# Patient Record
Sex: FEMALE | Race: WHITE | URBAN - METROPOLITAN AREA
[De-identification: names, ages, dates, MRNs, and addresses within clinical notes are randomized per-mention and may not be internally consistent; named-entity substitution may affect disease eponyms.]

---

## 2022-02-22 ENCOUNTER — EMERGENCY (EMERGENCY)
Facility: HOSPITAL | Age: 27
LOS: 0 days | Discharge: HOME | End: 2022-02-23
Attending: EMERGENCY MEDICINE
Payer: COMMERCIAL

## 2022-02-22 VITALS
TEMPERATURE: 98 F | DIASTOLIC BLOOD PRESSURE: 68 MMHG | HEIGHT: 61.42 IN | SYSTOLIC BLOOD PRESSURE: 105 MMHG | OXYGEN SATURATION: 97 % | HEART RATE: 94 BPM | WEIGHT: 119.93 LBS | RESPIRATION RATE: 18 BRPM

## 2022-02-22 DIAGNOSIS — F31.9 BIPOLAR DISORDER, UNSPECIFIED: ICD-10-CM

## 2022-02-22 DIAGNOSIS — Z20.822 CONTACT WITH AND (SUSPECTED) EXPOSURE TO COVID-19: ICD-10-CM

## 2022-02-22 DIAGNOSIS — R45.1 RESTLESSNESS AND AGITATION: ICD-10-CM

## 2022-02-22 DIAGNOSIS — R45.6 VIOLENT BEHAVIOR: ICD-10-CM

## 2022-02-22 DIAGNOSIS — F41.9 ANXIETY DISORDER, UNSPECIFIED: ICD-10-CM

## 2022-02-22 LAB
ALBUMIN SERPL ELPH-MCNC: 2.7 G/DL — LOW (ref 3.3–5)
ALP SERPL-CCNC: 88 U/L — SIGNIFICANT CHANGE UP (ref 40–120)
ALT FLD-CCNC: 42 U/L — SIGNIFICANT CHANGE UP (ref 12–78)
ANION GAP SERPL CALC-SCNC: 5 MMOL/L — SIGNIFICANT CHANGE UP (ref 5–17)
AST SERPL-CCNC: 50 U/L — HIGH (ref 15–37)
BASOPHILS # BLD AUTO: 0.02 K/UL — SIGNIFICANT CHANGE UP (ref 0–0.2)
BASOPHILS NFR BLD AUTO: 0.4 % — SIGNIFICANT CHANGE UP (ref 0–2)
BILIRUB SERPL-MCNC: 0.3 MG/DL — SIGNIFICANT CHANGE UP (ref 0.2–1.2)
BUN SERPL-MCNC: 13 MG/DL — SIGNIFICANT CHANGE UP (ref 7–23)
CALCIUM SERPL-MCNC: 8.8 MG/DL — SIGNIFICANT CHANGE UP (ref 8.5–10.1)
CHLORIDE SERPL-SCNC: 107 MMOL/L — SIGNIFICANT CHANGE UP (ref 96–108)
CO2 SERPL-SCNC: 28 MMOL/L — SIGNIFICANT CHANGE UP (ref 22–31)
CREAT SERPL-MCNC: 0.87 MG/DL — SIGNIFICANT CHANGE UP (ref 0.5–1.3)
EOSINOPHIL # BLD AUTO: 0.14 K/UL — SIGNIFICANT CHANGE UP (ref 0–0.5)
EOSINOPHIL NFR BLD AUTO: 2.5 % — SIGNIFICANT CHANGE UP (ref 0–6)
ETHANOL SERPL-MCNC: <10 MG/DL — SIGNIFICANT CHANGE UP (ref 0–10)
GLUCOSE SERPL-MCNC: 98 MG/DL — SIGNIFICANT CHANGE UP (ref 70–99)
HCT VFR BLD CALC: 35.8 % — SIGNIFICANT CHANGE UP (ref 34.5–45)
HGB BLD-MCNC: 11.8 G/DL — SIGNIFICANT CHANGE UP (ref 11.5–15.5)
IMM GRANULOCYTES NFR BLD AUTO: 0.4 % — SIGNIFICANT CHANGE UP (ref 0–1.5)
LYMPHOCYTES # BLD AUTO: 1.81 K/UL — SIGNIFICANT CHANGE UP (ref 1–3.3)
LYMPHOCYTES # BLD AUTO: 32.6 % — SIGNIFICANT CHANGE UP (ref 13–44)
MCHC RBC-ENTMCNC: 30.3 PG — SIGNIFICANT CHANGE UP (ref 27–34)
MCHC RBC-ENTMCNC: 33 GM/DL — SIGNIFICANT CHANGE UP (ref 32–36)
MCV RBC AUTO: 92 FL — SIGNIFICANT CHANGE UP (ref 80–100)
MONOCYTES # BLD AUTO: 0.76 K/UL — SIGNIFICANT CHANGE UP (ref 0–0.9)
MONOCYTES NFR BLD AUTO: 13.7 % — SIGNIFICANT CHANGE UP (ref 2–14)
NEUTROPHILS # BLD AUTO: 2.81 K/UL — SIGNIFICANT CHANGE UP (ref 1.8–7.4)
NEUTROPHILS NFR BLD AUTO: 50.4 % — SIGNIFICANT CHANGE UP (ref 43–77)
PLATELET # BLD AUTO: 142 K/UL — LOW (ref 150–400)
POTASSIUM SERPL-MCNC: 4.1 MMOL/L — SIGNIFICANT CHANGE UP (ref 3.5–5.3)
POTASSIUM SERPL-SCNC: 4.1 MMOL/L — SIGNIFICANT CHANGE UP (ref 3.5–5.3)
PROT SERPL-MCNC: 6.7 GM/DL — SIGNIFICANT CHANGE UP (ref 6–8.3)
RBC # BLD: 3.89 M/UL — SIGNIFICANT CHANGE UP (ref 3.8–5.2)
RBC # FLD: 13.1 % — SIGNIFICANT CHANGE UP (ref 10.3–14.5)
SODIUM SERPL-SCNC: 140 MMOL/L — SIGNIFICANT CHANGE UP (ref 135–145)
WBC # BLD: 5.56 K/UL — SIGNIFICANT CHANGE UP (ref 3.8–10.5)
WBC # FLD AUTO: 5.56 K/UL — SIGNIFICANT CHANGE UP (ref 3.8–10.5)

## 2022-02-22 PROCEDURE — U0005: CPT

## 2022-02-22 PROCEDURE — 85025 COMPLETE CBC W/AUTO DIFF WBC: CPT

## 2022-02-22 PROCEDURE — 80053 COMPREHEN METABOLIC PANEL: CPT

## 2022-02-22 PROCEDURE — 80307 DRUG TEST PRSMV CHEM ANLYZR: CPT

## 2022-02-22 PROCEDURE — 36415 COLL VENOUS BLD VENIPUNCTURE: CPT

## 2022-02-22 PROCEDURE — U0003: CPT

## 2022-02-22 PROCEDURE — 99283 EMERGENCY DEPT VISIT LOW MDM: CPT | Mod: 25

## 2022-02-22 PROCEDURE — 99285 EMERGENCY DEPT VISIT HI MDM: CPT

## 2022-02-22 PROCEDURE — 96372 THER/PROPH/DIAG INJ SC/IM: CPT

## 2022-02-22 PROCEDURE — 84702 CHORIONIC GONADOTROPIN TEST: CPT
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
## 2022-02-22 NOTE — ED PROVIDER NOTE - PATIENT PORTAL LINK FT
You can access the FollowMyHealth Patient Portal offered by [facility] by registering at the following website: [email or web address]. By joining [employer]’s FollowMyHealth portal, you will also be able to view your health information using other applications (apps) compatible with our system.

## 2022-02-22 NOTE — ED ADULT TRIAGE NOTE - CHIEF COMPLAINT QUOTE
patient brought in from [address] for explosive behavior.  as per staff, patient medications have been changed and over the day patient has become more aggressive and distractive in the home.  patient currently calm and cooperative at triage.

## 2022-02-23 VITALS
DIASTOLIC BLOOD PRESSURE: 69 MMHG | TEMPERATURE: 98 F | RESPIRATION RATE: 17 BRPM | SYSTOLIC BLOOD PRESSURE: 109 MMHG | OXYGEN SATURATION: 98 % | HEART RATE: 90 BPM

## 2022-02-23 DIAGNOSIS — F84.0 AUTISTIC DISORDER: ICD-10-CM

## 2022-02-23 DIAGNOSIS — F63.81 INTERMITTENT EXPLOSIVE DISORDER: ICD-10-CM

## 2022-02-23 LAB
APAP SERPL-MCNC: <2 UG/ML — LOW (ref 10–30)
HCG SERPL-ACNC: <1 MIU/ML — SIGNIFICANT CHANGE UP
SALICYLATES SERPL-MCNC: <1.7 MG/DL — LOW (ref 2.8–20)
SARS-COV-2 RNA SPEC QL NAA+PROBE: SIGNIFICANT CHANGE UP

## 2022-02-23 PROCEDURE — 90792 PSYCH DIAG EVAL W/MED SRVCS: CPT | Mod: 95

## 2022-02-23 RX ORDER — DIPHENHYDRAMINE HCL 50 MG
50 CAPSULE ORAL ONCE
Refills: 0 | Status: COMPLETED | OUTPATIENT
Start: 2022-02-23 | End: 2022-02-23

## 2022-02-23 RX ADMIN — Medication 2 MILLIGRAM(S): at 02:53

## 2022-02-23 RX ADMIN — Medication 50 MILLIGRAM(S): at 02:53

## 2022-02-23 NOTE — ED BEHAVIORAL HEALTH ASSESSMENT NOTE - HPI (INCLUDE ILLNESS QUALITY, SEVERITY, DURATION, TIMING, CONTEXT, MODIFYING FACTORS, ASSOCIATED SIGNS AND SYMPTOMS)
Mercedes is a 25yo woman (domiciled at [facility]) with PPHx of Intellectual Disability/Autism, Intermittent Explosive DO and charted BIpolar DO (previous hospitalizations, multiple ED visits for aggressive behavior, last seen in ED 2/15/22) BIB residence staff for increased aggressive behavior. Allegedly, she recently bit herself and broke her iPad.    Mercedes was interviewed in private room with 1:1. She was calm, pleasant, briefly engaged for conversation though limited. She repeatedly asked me when my birthday was. She reported feeling well but occasionally feeling sad, which she related to having a dream about [place]. She denied breaking an iPad at her residence, but did endorse biting herself and sustaining multiple "lugo israel." She denied having thoughts of harming herself, endorsed taking medication. She reported she completed school work every day. She began closing her eyes and covering up with blankets during interview, and eventually terminated in order to go to sleep. She did not require IM medicaiton in ED, or restraint. She was appropriate with staff and appeared calm.    See  note for collateral from group home staff. They report she has had a recent change in medication (though did not bring medication list). She is impulsive/violent at baseline. They deny safety concerns bringing her home.    Of note, Mercedes has two charts. She was seen in [facility] ED on 2/15/22 for similar complaint, treated and released. According to that chart, at that time she was prescribed risperidone 1mg/2mg, clonazepam 0.5mg/0.5mg/1mg. Unknown if her medications have changed since that listing. Mercedes is a 25yo woman (domiciled at [facility]) with PPHx of Intellectual Disability/Autism, Intermittent Explosive DO and charted BIpolar DO (previous hospitalizations, multiple ED visits for aggressive behavior, last seen in ED 2/15/22) BIB residence staff for increased aggressive behavior. Allegedly, she recently bit herself and broke her iPad.    Mercedes was interviewed in private room with 1:1. She was calm, pleasant, briefly engaged for conversation though limited. She repeatedly asked me when my birthday was. She reported feeling well but occasionally feeling sad, which she related to having a dream about [place]. She denied breaking an iPad at her residence, but did endorse biting herself and sustaining multiple "lugo israel." She denied having thoughts of harming herself, endorsed taking medication. She reported she completed school work every day (she attends day program). She began closing her eyes and covering up with blankets during interview, and eventually terminated in order to go to sleep. She did not require IM medication in ED, or restraint. She was appropriate with staff and appeared calm.    See  note for collateral from group home staff. They report she has had a recent change in medication (though did not bring medication list). She is impulsive/violent at baseline, though may be worse lately. They deny safety concerns bringing her home.    Of note, Mercedes has two charts. She was seen in [facility]\"" ED on 2/15/22 for similar complaint, treated and released. According to that chart, at that time she was prescribed risperidone 1mg/2mg, clonazepam 0.5mg/0.5mg/1mg. Unknown if her medications have changed since that listing.
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
## 2022-02-23 NOTE — ED BEHAVIORAL HEALTH ASSESSMENT NOTE - RISK ASSESSMENT
Mod: Improperly treated impulsivity  Unmod: Intellectual disability, hx aggression, hx hospitalization, poor insight  Protective: not suicidal, structured home environment    Elevated risk by history and diagnosis, but acute risk low, not violent in ED. Risk agusto be mitigated by continued outpatient care and use of structured OPWDD setting. Low Acute Suicide Risk

## 2022-02-23 NOTE — ED ADULT NURSE NOTE - CHIEF COMPLAINT QUOTE
patient brought in from [address] for explosive behavior.  as per staff, patient medications have been changed and over the day patient has become more aggressive and distractive in the home.  patient currently calm and cooperative at triage.

## 2022-02-23 NOTE — ED ADULT NURSE NOTE - OBJECTIVE STATEMENT
patient brought in from [address] for explosive behavior.  as per staff, patient medications have been changed and over the day patient has become more aggressive and distractive in the home.  patient currently calm and cooperative at triage.

## 2022-02-23 NOTE — ED BEHAVIORAL HEALTH ASSESSMENT NOTE - REFERRAL / APPOINTMENT DETAILS
Nursing Note by Zen Gonzales at 06/22/18 10:34 AM     Author:  Zen Gonzales Service:  (none) Author Type:  Certified Medical Assistant     Filed:  06/22/18 10:35 AM Encounter Date:  6/22/2018 Status:  Signed     :  Zen Gonzales (Certified Medical Assistant)            If provider orders tests at today's visit, patient would like to be contacted via ([employer] or by telephone). If to contact patient by phone, patient's preferred phone # is [phone] (cell) and it is ok to leave message on voice mail or with family member.  If medications are ordered at today's visit, the pharmacy name/location patient would like them to be sent to is    [facility] [address][EL1.1T]      Revision History        User Key Date/Time User Provider Type Action    > EL1.1 06/22/18 10:35 AM Zen Gonzales Certified Medical Assistant Sign    T - Template
F/u with current provider

## 2023-01-24 ENCOUNTER — OFFICE (OUTPATIENT)
Dept: URBAN - METROPOLITAN AREA CLINIC 109 | Facility: CLINIC | Age: 28
Setting detail: OPHTHALMOLOGY
End: 2023-01-24
Payer: COMMERCIAL

## 2023-01-24 DIAGNOSIS — E11.9: ICD-10-CM

## 2023-01-24 PROCEDURE — 92014 COMPRE OPH EXAM EST PT 1/>: CPT | Performed by: OPHTHALMOLOGY

## 2023-01-24 ASSESSMENT — VISUAL ACUITY
OS_BCVA: 20/25-2
OD_BCVA: 20/25

## 2023-01-24 ASSESSMENT — TONOMETRY
OS_IOP_MMHG: 10
OD_IOP_MMHG: 10

## 2023-02-21 ENCOUNTER — EMERGENCY (EMERGENCY)
Facility: HOSPITAL | Age: 28
LOS: 0 days | Discharge: HOME | End: 2023-02-22
Attending: EMERGENCY MEDICINE
Payer: COMMERCIAL

## 2023-02-21 VITALS
RESPIRATION RATE: 16 BRPM | SYSTOLIC BLOOD PRESSURE: 106 MMHG | HEART RATE: 75 BPM | TEMPERATURE: 98 F | DIASTOLIC BLOOD PRESSURE: 74 MMHG | OXYGEN SATURATION: 100 % | WEIGHT: 125 LBS

## 2023-02-21 DIAGNOSIS — R10.9 UNSPECIFIED ABDOMINAL PAIN: ICD-10-CM

## 2023-02-21 DIAGNOSIS — F60.3 BORDERLINE PERSONALITY DISORDER: ICD-10-CM

## 2023-02-21 DIAGNOSIS — K59.00 CONSTIPATION, UNSPECIFIED: ICD-10-CM

## 2023-02-21 DIAGNOSIS — F84.0 AUTISTIC DISORDER: ICD-10-CM

## 2023-02-21 DIAGNOSIS — Z79.84 LONG TERM (CURRENT) USE OF ORAL HYPOGLYCEMIC DRUGS: ICD-10-CM

## 2023-02-21 DIAGNOSIS — R73.03 PREDIABETES: ICD-10-CM

## 2023-02-21 LAB
ALBUMIN SERPL ELPH-MCNC: 2.9 G/DL — LOW (ref 3.3–5)
ALP SERPL-CCNC: 68 U/L — SIGNIFICANT CHANGE UP (ref 40–120)
ALT FLD-CCNC: 20 U/L — SIGNIFICANT CHANGE UP (ref 12–78)
ANION GAP SERPL CALC-SCNC: 3 MMOL/L — LOW (ref 5–17)
APPEARANCE UR: CLEAR — SIGNIFICANT CHANGE UP
AST SERPL-CCNC: 25 U/L — SIGNIFICANT CHANGE UP (ref 15–37)
BACTERIA # UR AUTO: ABNORMAL
BASOPHILS # BLD AUTO: 0.03 K/UL — SIGNIFICANT CHANGE UP (ref 0–0.2)
BASOPHILS NFR BLD AUTO: 0.4 % — SIGNIFICANT CHANGE UP (ref 0–2)
BILIRUB SERPL-MCNC: 0.3 MG/DL — SIGNIFICANT CHANGE UP (ref 0.2–1.2)
BILIRUB UR-MCNC: NEGATIVE — SIGNIFICANT CHANGE UP
BUN SERPL-MCNC: 16 MG/DL — SIGNIFICANT CHANGE UP (ref 7–23)
CALCIUM SERPL-MCNC: 8.5 MG/DL — SIGNIFICANT CHANGE UP (ref 8.5–10.1)
CHLORIDE SERPL-SCNC: 105 MMOL/L — SIGNIFICANT CHANGE UP (ref 96–108)
CO2 SERPL-SCNC: 28 MMOL/L — SIGNIFICANT CHANGE UP (ref 22–31)
COLOR SPEC: YELLOW — SIGNIFICANT CHANGE UP
CREAT SERPL-MCNC: 0.74 MG/DL — SIGNIFICANT CHANGE UP (ref 0.5–1.3)
DIFF PNL FLD: NEGATIVE — SIGNIFICANT CHANGE UP
EGFR: 114 ML/MIN/1.73M2 — SIGNIFICANT CHANGE UP
EOSINOPHIL # BLD AUTO: 0.08 K/UL — SIGNIFICANT CHANGE UP (ref 0–0.5)
EOSINOPHIL NFR BLD AUTO: 1 % — SIGNIFICANT CHANGE UP (ref 0–6)
EPI CELLS # UR: SIGNIFICANT CHANGE UP
GLUCOSE SERPL-MCNC: 97 MG/DL — SIGNIFICANT CHANGE UP (ref 70–99)
GLUCOSE UR QL: NEGATIVE — SIGNIFICANT CHANGE UP
HCT VFR BLD CALC: 36.3 % — SIGNIFICANT CHANGE UP (ref 34.5–45)
HGB BLD-MCNC: 12.4 G/DL — SIGNIFICANT CHANGE UP (ref 11.5–15.5)
IMM GRANULOCYTES NFR BLD AUTO: 0.1 % — SIGNIFICANT CHANGE UP (ref 0–0.9)
KETONES UR-MCNC: ABNORMAL
LEUKOCYTE ESTERASE UR-ACNC: ABNORMAL
LIDOCAIN IGE QN: 231 U/L — SIGNIFICANT CHANGE UP (ref 73–393)
LYMPHOCYTES # BLD AUTO: 3.01 K/UL — SIGNIFICANT CHANGE UP (ref 1–3.3)
LYMPHOCYTES # BLD AUTO: 39.1 % — SIGNIFICANT CHANGE UP (ref 13–44)
MCHC RBC-ENTMCNC: 30.8 PG — SIGNIFICANT CHANGE UP (ref 27–34)
MCHC RBC-ENTMCNC: 34.2 GM/DL — SIGNIFICANT CHANGE UP (ref 32–36)
MCV RBC AUTO: 90.1 FL — SIGNIFICANT CHANGE UP (ref 80–100)
MONOCYTES # BLD AUTO: 0.93 K/UL — HIGH (ref 0–0.9)
MONOCYTES NFR BLD AUTO: 12.1 % — SIGNIFICANT CHANGE UP (ref 2–14)
NEUTROPHILS # BLD AUTO: 3.64 K/UL — SIGNIFICANT CHANGE UP (ref 1.8–7.4)
NEUTROPHILS NFR BLD AUTO: 47.3 % — SIGNIFICANT CHANGE UP (ref 43–77)
NITRITE UR-MCNC: NEGATIVE — SIGNIFICANT CHANGE UP
PH UR: 7 — SIGNIFICANT CHANGE UP (ref 5–8)
PLATELET # BLD AUTO: 174 K/UL — SIGNIFICANT CHANGE UP (ref 150–400)
POTASSIUM SERPL-MCNC: 3.7 MMOL/L — SIGNIFICANT CHANGE UP (ref 3.5–5.3)
POTASSIUM SERPL-SCNC: 3.7 MMOL/L — SIGNIFICANT CHANGE UP (ref 3.5–5.3)
PROT SERPL-MCNC: 6.9 GM/DL — SIGNIFICANT CHANGE UP (ref 6–8.3)
PROT UR-MCNC: NEGATIVE — SIGNIFICANT CHANGE UP
RBC # BLD: 4.03 M/UL — SIGNIFICANT CHANGE UP (ref 3.8–5.2)
RBC # FLD: 13 % — SIGNIFICANT CHANGE UP (ref 10.3–14.5)
RBC CASTS # UR COMP ASSIST: NEGATIVE /HPF — SIGNIFICANT CHANGE UP (ref 0–4)
SODIUM SERPL-SCNC: 136 MMOL/L — SIGNIFICANT CHANGE UP (ref 135–145)
SP GR SPEC: 1 — LOW (ref 1.01–1.02)
UROBILINOGEN FLD QL: 1
WBC # BLD: 7.7 K/UL — SIGNIFICANT CHANGE UP (ref 3.8–10.5)
WBC # FLD AUTO: 7.7 K/UL — SIGNIFICANT CHANGE UP (ref 3.8–10.5)
WBC UR QL: SIGNIFICANT CHANGE UP /HPF (ref 0–5)

## 2023-02-21 PROCEDURE — 85025 COMPLETE CBC W/AUTO DIFF WBC: CPT

## 2023-02-21 PROCEDURE — 96374 THER/PROPH/DIAG INJ IV PUSH: CPT

## 2023-02-21 PROCEDURE — 81001 URINALYSIS AUTO W/SCOPE: CPT

## 2023-02-21 PROCEDURE — 83690 ASSAY OF LIPASE: CPT

## 2023-02-21 PROCEDURE — 87086 URINE CULTURE/COLONY COUNT: CPT

## 2023-02-21 PROCEDURE — 84702 CHORIONIC GONADOTROPIN TEST: CPT

## 2023-02-21 PROCEDURE — 99284 EMERGENCY DEPT VISIT MOD MDM: CPT | Mod: 25

## 2023-02-21 PROCEDURE — 36415 COLL VENOUS BLD VENIPUNCTURE: CPT

## 2023-02-21 PROCEDURE — 80053 COMPREHEN METABOLIC PANEL: CPT

## 2023-02-21 PROCEDURE — 99284 EMERGENCY DEPT VISIT MOD MDM: CPT

## 2023-02-21 PROCEDURE — 74018 RADEX ABDOMEN 1 VIEW: CPT

## 2023-02-21 NOTE — ED PROVIDER NOTE - NSFOLLOWUPINSTRUCTIONS_ED_ALL_ED_FT
D/C with PMD follow up and anticipatory guidance.  Return for worsening or persistent symptoms.  please return for any new or worsening symptoms.

## 2023-02-21 NOTE — ED ADULT TRIAGE NOTE - CHIEF COMPLAINT QUOTE
Pt. BIBEMS from group home c/o abdominal pain. As per EMS pt. has hx. autism and began c/o abdominal pain this afternoon and became agitated. Pt. currently not c/o pain.

## 2023-02-21 NOTE — ED PROVIDER NOTE - WR ORDER DATE AND TIME 1
Addended by: Freddie Santana on: 6/13/2019 04:25 PM     Modules accepted: Orders, SmartSet
21-Feb-2023 22:29

## 2023-02-21 NOTE — ED PROVIDER NOTE - PATIENT PORTAL LINK FT
You can access the FollowMyHealth Patient Portal offered by [facility] by registering at the following website: [email or web address]. By joining [email or web address]’s FollowMyHealth portal, you will also be able to view your health information using other applications (apps) compatible with our system.

## 2023-02-21 NOTE — ED PROVIDER NOTE - CLINICAL SUMMARY MEDICAL DECISION MAKING FREE TEXT BOX
Labs WNl.  XR with stool burden, no SBO.  No abdominal TTP on exam, patient appear well, hydrated.  D/c home with supportive care, f/u with PCP, return precautions given.

## 2023-02-21 NOTE — ED PROVIDER NOTE - OBJECTIVE STATEMENT
28 y/o female with PMHx of prediabetes, autism, and impulsive personality disorder BIBEMS from group home to ED c/o abdominal pain. Per EMS, symptoms started this afternoon and pt became more agitated. Pt is a poor historian, unable to give reliable history and aide is not present. Pt points to middle of abdomen when asked where pain is.

## 2023-02-21 NOTE — ED PROVIDER NOTE - PROGRESS NOTE DETAILS
Migdalia SAM: s/o received from Dr. Hidalgo, pending abdominal XR. appears wnl with large stool burden. nontender abdomen while in ED.  will dc home.

## 2023-02-21 NOTE — ED ADULT NURSE NOTE - OBJECTIVE STATEMENT
Pt brought in by EMS from group home for abdominal pain. Pt has hx autism and is Aox1 at baseline. Pt is poor medical historian but able to give details on location of pain. Pt endorses abdominal pain and pointed to epigastric region. Pt unable to give any other s/s. Pt is ambulatory in the ED with steady gait and ambulated to bathroom. Pt aide from group home at bedside at 23:15. Pt safety and comfort maintained.

## 2023-02-22 LAB — HCG SERPL-ACNC: <1 MIU/ML — SIGNIFICANT CHANGE UP

## 2023-02-22 PROCEDURE — 74018 RADEX ABDOMEN 1 VIEW: CPT | Mod: 26

## 2023-02-22 RX ORDER — LANOLIN/MINERAL OIL
1 LOTION (ML) TOPICAL
Qty: 0 | Refills: 0 | DISCHARGE

## 2023-02-22 RX ORDER — METFORMIN HYDROCHLORIDE 850 MG/1
1 TABLET ORAL
Qty: 0 | Refills: 0 | DISCHARGE

## 2023-02-22 RX ORDER — FLUTICASONE PROPIONATE 0.5 MG/G
1 CREAM TOPICAL
Qty: 0 | Refills: 0 | DISCHARGE

## 2023-02-22 RX ORDER — OLANZAPINE 15 MG/1
1 TABLET, FILM COATED ORAL
Qty: 0 | Refills: 0 | DISCHARGE

## 2023-02-22 RX ORDER — SERTRALINE 25 MG/1
1 TABLET, FILM COATED ORAL
Qty: 0 | Refills: 0 | DISCHARGE

## 2023-02-22 RX ORDER — ACETAMINOPHEN AND PAMABROM AND PYRILAMINE MALEATE 500; 25; 15 MG/1; MG/1; MG/1
2 TABLET ORAL
Qty: 0 | Refills: 0 | DISCHARGE

## 2023-02-22 RX ORDER — DIVALPROEX SODIUM 500 MG/1
1 TABLET, DELAYED RELEASE ORAL
Qty: 0 | Refills: 0 | DISCHARGE

## 2023-02-22 RX ORDER — DIVALPROEX SODIUM 500 MG/1
3 TABLET, DELAYED RELEASE ORAL
Qty: 0 | Refills: 0 | DISCHARGE

## 2023-02-22 RX ORDER — LANOLIN ALCOHOL/MO/W.PET/CERES
1 CREAM (GRAM) TOPICAL
Qty: 0 | Refills: 0 | DISCHARGE

## 2023-02-22 RX ORDER — SODIUM FLUORIDE 1.1 G/100G
1 GEL ORAL
Qty: 0 | Refills: 0 | DISCHARGE

## 2023-02-22 RX ORDER — CLONAZEPAM 1 MG
1 TABLET ORAL
Qty: 0 | Refills: 0 | DISCHARGE

## 2023-02-22 RX ADMIN — Medication 0.5 MILLIGRAM(S): at 02:04

## 2023-02-22 NOTE — ED POST DISCHARGE NOTE - REASON FOR FOLLOW-UP
Pt called looking for XR abd results. Informed her that the XRs showed stool burden and to use stool softners along with chaging her diet to included fruits, vegetales and fber. Pt aware and agrees with plan. -Kunal Syed PA-C Other

## 2023-02-23 LAB
CULTURE RESULTS: SIGNIFICANT CHANGE UP
SPECIMEN SOURCE: SIGNIFICANT CHANGE UP

## 2023-04-04 ASSESSMENT — SPHEQUIV_DERIVED
OS_SPHEQUIV: -2.125
OD_SPHEQUIV: -2.5

## 2023-04-04 ASSESSMENT — REFRACTION_CURRENTRX
OD_AXIS: 0
OD_SPHERE: -1.75
OD_CYLINDER: 0.00
OS_AXIS: 0
OS_OVR_VA: 20/
OS_CYLINDER: 0.00
OS_SPHERE: -1.50
OD_OVR_VA: 20/

## 2023-04-04 ASSESSMENT — REFRACTION_AUTOREFRACTION
OD_AXIS: 023
OD_SPHERE: -2.25
OS_AXIS: 180
OD_CYLINDER: -0.50
OS_SPHERE: -2.00
OS_CYLINDER: -0.25

## 2023-04-04 ASSESSMENT — REFRACTION_MANIFEST
OD_SPHERE: -1.75
OS_SPHERE: -1.50

## 2023-04-27 NOTE — ED BEHAVIORAL HEALTH ASSESSMENT NOTE - VIOLENCE RISK FACTORS:
Azithromycin Pregnancy And Lactation Text: This medication is considered safe during pregnancy and is also secreted in breast milk. Affective dysregulation/Impulsivity/Lack of insight into violence risk/need for treatment

## 2023-10-16 NOTE — ED ADULT NURSE REASSESSMENT NOTE - NS ED NURSE REASSESS COMMENT FT1
Assumed care of patient from previous RN. Patient currently speaking with telepsychiatry. 1:1 maintained in behavioral health room. Will continue to monitor. Quality 130: Documentation Of Current Medications In The Medical Record: Current Medications Documented Quality 110: Preventive Care And Screening: Influenza Immunization: Influenza immunization was not ordered or administered, reason not given Quality 431: Preventive Care And Screening: Unhealthy Alcohol Use - Screening: Patient not identified as an unhealthy alcohol user when screened for unhealthy alcohol use using a systematic screening method Detail Level: Detailed Quality 226: Preventive Care And Screening: Tobacco Use: Screening And Cessation Intervention: Patient screened for tobacco use and is an ex/non-smoker

## 2024-02-04 NOTE — ED BEHAVIORAL HEALTH ASSESSMENT NOTE - CURRENT INTENT:
Alert and oriented to person, place and time/Awake/Symptoms improved/No adverse reaction to first time med in ED None known

## 2024-04-19 ENCOUNTER — EMERGENCY (EMERGENCY)
Facility: HOSPITAL | Age: 29
LOS: 0 days | Discharge: HOME | End: 2024-04-20
Attending: EMERGENCY MEDICINE
Payer: COMMERCIAL

## 2024-04-19 VITALS
SYSTOLIC BLOOD PRESSURE: 104 MMHG | RESPIRATION RATE: 16 BRPM | DIASTOLIC BLOOD PRESSURE: 76 MMHG | TEMPERATURE: 98 F | OXYGEN SATURATION: 97 % | HEART RATE: 88 BPM

## 2024-04-19 DIAGNOSIS — S01.01XA LACERATION WITHOUT FOREIGN BODY OF SCALP, INITIAL ENCOUNTER: ICD-10-CM

## 2024-04-19 DIAGNOSIS — S09.90XA UNSPECIFIED INJURY OF HEAD, INITIAL ENCOUNTER: ICD-10-CM

## 2024-04-19 DIAGNOSIS — R45.6 VIOLENT BEHAVIOR: ICD-10-CM

## 2024-04-19 DIAGNOSIS — F84.0 AUTISTIC DISORDER: ICD-10-CM
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
## 2024-04-19 DIAGNOSIS — R62.50 UNSPECIFIED LACK OF EXPECTED NORMAL PHYSIOLOGICAL DEVELOPMENT IN CHILDHOOD: ICD-10-CM

## 2024-04-19 DIAGNOSIS — S40.022A CONTUSION OF LEFT UPPER ARM, INITIAL ENCOUNTER: ICD-10-CM

## 2024-04-19 DIAGNOSIS — F63.9 IMPULSE DISORDER, UNSPECIFIED: ICD-10-CM
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
## 2024-04-19 DIAGNOSIS — S40.021A CONTUSION OF RIGHT UPPER ARM, INITIAL ENCOUNTER: ICD-10-CM

## 2024-04-19 PROCEDURE — 70450 CT HEAD/BRAIN W/O DYE: CPT | Mod: 26,MC

## 2024-04-19 PROCEDURE — 99284 EMERGENCY DEPT VISIT MOD MDM: CPT | Mod: 25

## 2024-04-19 PROCEDURE — 12001 RPR S/N/AX/GEN/TRNK 2.5CM/<: CPT

## 2024-04-19 PROCEDURE — 96372 THER/PROPH/DIAG INJ SC/IM: CPT | Mod: XU

## 2024-04-19 PROCEDURE — 70450 CT HEAD/BRAIN W/O DYE: CPT | Mod: MC

## 2024-04-19 PROCEDURE — 99285 EMERGENCY DEPT VISIT HI MDM: CPT | Mod: 25

## 2024-04-19 RX ADMIN — Medication 2 MILLIGRAM(S): at 21:58

## 2024-04-19 NOTE — ED ADULT NURSE REASSESSMENT NOTE - NS ED NURSE REASSESS COMMENT FT1
Pt is calm and cooperative. Pt watching TV and coloring. Group home and mother at bedside. Safety and comfort maintained.

## 2024-04-19 NOTE — ED ADULT NURSE NOTE - CHIEF COMPLAINT QUOTE
pt presents to the ED BIB EMS from [facility] after episode of agitation. as per [facility] staff at bedside, pt became agitated because she ran out of tokens to play games. she was brought back home and when she was going to her room, she hit her head on the banister, laceration noted to scalp. denies LOC. pt agitated at triage but as per [facility] staff this is her baseline when she becomes agitated.

## 2024-04-19 NOTE — ED PROVIDER NOTE - PHYSICAL EXAMINATION
Constitutional: NAD AOx3  Eyes: PERRL EOMI  Head: Normocephalic, 2 cm laceration on top of head  Mouth: MMM  Cardiac: regular rate and rhythm  Resp: Lungs CTAB  GI: Abd s/nd/nt  Neuro: CN2-12 grossly intact, ESCOBAR x 4  Extremities: Ecchymosis to B/L upper extremities  Skin: No visible rashes

## 2024-04-19 NOTE — ED ADULT TRIAGE NOTE - INTERNATIONAL TRAVEL
H&P/consult/progress notes reviewed, the patient was examined, and \"no changes\" have occurred in the patients condition since the H&P was complete within the past 30 days. Please refer to the H&P for full details.    I have discussed the risks, benefits, and alternatives to upper EUS with the patient [who demonstrated understanding], including but not limited to the risks of bleeding, infection, pancreatitis, pain, death, as well as the risks of anesthesia and perforation all leading to prolonged hospital stay, surgical intervention, or even death. The alternatives were discussed with the patient.  All questions were answered to the patient's satisfaction. The patient signed informed consent and elected to proceed with EUS with intervention [i.e. FNA, biliary access, etc.] as indicated.     Rony Fay MD MS  [facility]    
No

## 2024-04-19 NOTE — ED PROVIDER NOTE - PATIENT PORTAL LINK FT
You can access the FollowMyHealth Patient Portal offered by [facility] by registering at the following website: [email or web address]. By joining [employer]’s FollowMyHealth portal, you will also be able to view your health information using other applications (apps) compatible with our system.

## 2024-04-19 NOTE — ED ADULT NURSE NOTE - OBJECTIVE STATEMENT
Pt is 28y F, A&Ox3 F, who presents to ED with c/o laceration to head. As per mother and group home staff, pt became agitated when running out coins while playing game. Staff at bedside reports that pt then ran off and hit head on banister. (+)HS, (-)LOC, (-)use of blood thinners. Pt denies dizziness. laceration noted to top of head, no active bleeding noted.   hx of autism.

## 2024-04-19 NOTE — ED ADULT NURSE NOTE - PAIN: PRESENCE, MLM
Dereck: No hyper-acute T waves, no malignant dysrhythmia, no ischemic ST segment changes.
denies pain/discomfort (Rating = 0)

## 2024-04-19 NOTE — ED PROVIDER NOTE - NSFOLLOWUPINSTRUCTIONS_ED_ALL_ED_FT
Continue usual care and medicines.     No shower for 24 hours, then can shower as usual, gentle rinsing where the laceration and staples are.      Staples to be removed in 5 days with PMD.      Return to ER for worsening, any concerns.

## 2024-04-19 NOTE — ED PROVIDER NOTE - OBJECTIVE STATEMENT
27 y/o female with PMHx of autism disorder, developmental delay, impulsive behavior disorder; presents to ED from group home c/o head injury. Patient was at [employer] got agitated after running out of tokens and hit head purposefully on railing. Staff states this is normla bheavior when patient gets upset. Now clam and cooperative, mother at bedside. Vaccinations UTD.

## 2024-04-19 NOTE — ED PROVIDER NOTE - CARE PLAN
1 Principal Discharge DX:	Head injury  Secondary Diagnosis:	Laceration of scalp  Secondary Diagnosis:	Aggressive behavior

## 2024-04-20 VITALS
DIASTOLIC BLOOD PRESSURE: 75 MMHG | HEART RATE: 88 BPM | SYSTOLIC BLOOD PRESSURE: 100 MMHG | RESPIRATION RATE: 18 BRPM | OXYGEN SATURATION: 98 % | TEMPERATURE: 98 F

## 2024-04-20 NOTE — ED PROCEDURE NOTE - PROCEDURE ADDITIONAL DETAILS
Pt was medicated with Ativan prior to procedure in .  Pt was sleeping throughout procedure.  Applied Bacitracin to wound after repair.

## 2024-11-20 NOTE — ED ADULT NURSE NOTE - TEMPLATE
Abdominal Pain, N/V/D Duration Of Freeze Thaw-Cycle (Seconds): 2 Show Aperture Variable?: Yes Render Post-Care Instructions In Note?: no Consent: The patient's consent was obtained including but not limited to risks of crusting, scabbing, blistering, scarring, darker or lighter pigmentary change, recurrence, incomplete removal and infection. Post-Care Instructions: I reviewed with the patient in detail post-care instructions. Patient is to wear sunprotection, and avoid picking at any of the treated lesions. Pt may apply Vaseline to crusted or scabbing areas. Detail Level: Detailed

## 2024-12-10 NOTE — ED ADULT TRIAGE NOTE - NS ED TRIAGE AVPU SCALE
Please set pt up for another reclast infusion in infusion clinic.   Alert-The patient is alert, awake and responds to voice. The patient is oriented to time, place, and person. The triage nurse is able to obtain subjective information.

## 2025-02-05 ENCOUNTER — OFFICE (OUTPATIENT)
Dept: URBAN - METROPOLITAN AREA CLINIC 109 | Facility: CLINIC | Age: 30
Setting detail: OPHTHALMOLOGY
End: 2025-02-05
Payer: COMMERCIAL

## 2025-02-05 DIAGNOSIS — E11.9: ICD-10-CM

## 2025-02-05 PROCEDURE — 99213 OFFICE O/P EST LOW 20 MIN: CPT | Performed by: OPHTHALMOLOGY

## 2025-02-05 ASSESSMENT — REFRACTION_AUTOREFRACTION
OS_CYLINDER: -0.25
OD_AXIS: 023
OD_CYLINDER: -0.50
OD_SPHERE: -2.25
OS_AXIS: 180
OS_SPHERE: -2.00

## 2025-02-05 ASSESSMENT — REFRACTION_CURRENTRX
OD_OVR_VA: 20/
OD_SPHERE: -1.75
OD_AXIS: 0
OS_AXIS: 0
OS_OVR_VA: 20/
OS_CYLINDER: 0.00
OD_CYLINDER: 0.00
OS_SPHERE: -1.50

## 2025-02-05 ASSESSMENT — TONOMETRY
OD_IOP_MMHG: 10
OS_IOP_MMHG: 10

## 2025-02-05 ASSESSMENT — REFRACTION_MANIFEST
OD_SPHERE: -1.75
OS_SPHERE: -1.50

## 2025-02-05 ASSESSMENT — CONFRONTATIONAL VISUAL FIELD TEST (CVF)
OD_FINDINGS: FULL
OS_FINDINGS: FULL

## 2025-02-05 ASSESSMENT — VISUAL ACUITY
OD_BCVA: 20/25
OS_BCVA: 20/25